# Patient Record
Sex: FEMALE | Race: WHITE | NOT HISPANIC OR LATINO | Employment: UNEMPLOYED | URBAN - METROPOLITAN AREA
[De-identification: names, ages, dates, MRNs, and addresses within clinical notes are randomized per-mention and may not be internally consistent; named-entity substitution may affect disease eponyms.]

---

## 2023-01-22 ENCOUNTER — HOSPITAL ENCOUNTER (OUTPATIENT)
Facility: CLINIC | Age: 48
Setting detail: OBSERVATION
Discharge: HOME | End: 2023-01-24
Attending: EMERGENCY MEDICINE | Admitting: PSYCHIATRY & NEUROLOGY
Payer: MEDICARE

## 2023-01-22 DIAGNOSIS — R45.851 SUICIDAL IDEATION: ICD-10-CM

## 2023-01-22 DIAGNOSIS — F41.1 GAD (GENERALIZED ANXIETY DISORDER): ICD-10-CM

## 2023-01-22 DIAGNOSIS — R44.0 AUDITORY HALLUCINATION: ICD-10-CM

## 2023-01-22 DIAGNOSIS — F42.9 OBSESSIVE-COMPULSIVE DISORDER, UNSPECIFIED TYPE: ICD-10-CM

## 2023-01-22 DIAGNOSIS — F51.01 PRIMARY INSOMNIA: ICD-10-CM

## 2023-01-22 DIAGNOSIS — F43.10 PTSD (POST-TRAUMATIC STRESS DISORDER): ICD-10-CM

## 2023-01-22 LAB
ALBUMIN SERPL BCG-MCNC: 4.4 G/DL (ref 3.5–5.2)
ALBUMIN UR-MCNC: 50 MG/DL
ALP SERPL-CCNC: 100 U/L (ref 35–104)
ALT SERPL W P-5'-P-CCNC: 13 U/L (ref 10–35)
AMPHETAMINES UR QL SCN: ABNORMAL
ANION GAP SERPL CALCULATED.3IONS-SCNC: 9 MMOL/L (ref 7–15)
APPEARANCE UR: CLEAR
AST SERPL W P-5'-P-CCNC: 24 U/L (ref 10–35)
BARBITURATES UR QL SCN: ABNORMAL
BASOPHILS # BLD AUTO: 0 10E3/UL (ref 0–0.2)
BASOPHILS NFR BLD AUTO: 1 %
BENZODIAZ UR QL SCN: ABNORMAL
BILIRUB SERPL-MCNC: 0.5 MG/DL
BILIRUB UR QL STRIP: NEGATIVE
BUN SERPL-MCNC: 11.8 MG/DL (ref 6–20)
BZE UR QL SCN: ABNORMAL
CALCIUM SERPL-MCNC: 9.2 MG/DL (ref 8.6–10)
CANNABINOIDS UR QL SCN: ABNORMAL
CHLORIDE SERPL-SCNC: 103 MMOL/L (ref 98–107)
COLOR UR AUTO: YELLOW
CREAT SERPL-MCNC: 0.58 MG/DL (ref 0.51–0.95)
DACRYOCYTES BLD QL SMEAR: SLIGHT
DEPRECATED HCO3 PLAS-SCNC: 25 MMOL/L (ref 22–29)
ELLIPTOCYTES BLD QL SMEAR: SLIGHT
EOSINOPHIL # BLD AUTO: 0.1 10E3/UL (ref 0–0.7)
EOSINOPHIL NFR BLD AUTO: 2 %
ERYTHROCYTE [DISTWIDTH] IN BLOOD BY AUTOMATED COUNT: 19.3 % (ref 10–15)
ETHANOL SERPL-MCNC: <0.01 G/DL
GFR SERPL CREATININE-BSD FRML MDRD: >90 ML/MIN/1.73M2
GLUCOSE SERPL-MCNC: 93 MG/DL (ref 70–99)
GLUCOSE UR STRIP-MCNC: NEGATIVE MG/DL
HCG SERPL QL: NEGATIVE
HCT VFR BLD AUTO: 28.2 % (ref 35–47)
HGB BLD-MCNC: 7.3 G/DL (ref 11.7–15.7)
HGB UR QL STRIP: NEGATIVE
IMM GRANULOCYTES # BLD: 0 10E3/UL
IMM GRANULOCYTES NFR BLD: 0 %
KETONES UR STRIP-MCNC: NEGATIVE MG/DL
LEUKOCYTE ESTERASE UR QL STRIP: ABNORMAL
LYMPHOCYTES # BLD AUTO: 1.6 10E3/UL (ref 0.8–5.3)
LYMPHOCYTES NFR BLD AUTO: 30 %
MCH RBC QN AUTO: 16 PG (ref 26.5–33)
MCHC RBC AUTO-ENTMCNC: 25.9 G/DL (ref 31.5–36.5)
MCV RBC AUTO: 62 FL (ref 78–100)
MONOCYTES # BLD AUTO: 0.5 10E3/UL (ref 0–1.3)
MONOCYTES NFR BLD AUTO: 9 %
MUCOUS THREADS #/AREA URNS LPF: PRESENT /LPF
NEUTROPHILS # BLD AUTO: 3.2 10E3/UL (ref 1.6–8.3)
NEUTROPHILS NFR BLD AUTO: 58 %
NITRATE UR QL: NEGATIVE
NRBC # BLD AUTO: 0 10E3/UL
NRBC BLD AUTO-RTO: 0 /100
OPIATES UR QL SCN: ABNORMAL
PCP QUAL URINE (ROCHE): ABNORMAL
PH UR STRIP: 5.5 [PH] (ref 5–7)
PLAT MORPH BLD: ABNORMAL
PLATELET # BLD AUTO: 398 10E3/UL (ref 150–450)
POTASSIUM SERPL-SCNC: 3.5 MMOL/L (ref 3.4–5.3)
PROT SERPL-MCNC: 8.1 G/DL (ref 6.4–8.3)
RBC # BLD AUTO: 4.55 10E6/UL (ref 3.8–5.2)
RBC MORPH BLD: ABNORMAL
RBC URINE: 2 /HPF
SARS-COV-2 RNA RESP QL NAA+PROBE: NEGATIVE
SODIUM SERPL-SCNC: 137 MMOL/L (ref 136–145)
SP GR UR STRIP: 1.03 (ref 1–1.03)
SQUAMOUS EPITHELIAL: 4 /HPF
UROBILINOGEN UR STRIP-MCNC: NORMAL MG/DL
WBC # BLD AUTO: 5.4 10E3/UL (ref 4–11)
WBC URINE: 7 /HPF

## 2023-01-22 PROCEDURE — 80307 DRUG TEST PRSMV CHEM ANLYZR: CPT | Performed by: EMERGENCY MEDICINE

## 2023-01-22 PROCEDURE — U0005 INFEC AGEN DETEC AMPLI PROBE: HCPCS | Performed by: EMERGENCY MEDICINE

## 2023-01-22 PROCEDURE — 85025 COMPLETE CBC W/AUTO DIFF WBC: CPT | Performed by: EMERGENCY MEDICINE

## 2023-01-22 PROCEDURE — 84703 CHORIONIC GONADOTROPIN ASSAY: CPT | Performed by: EMERGENCY MEDICINE

## 2023-01-22 PROCEDURE — 81001 URINALYSIS AUTO W/SCOPE: CPT | Performed by: EMERGENCY MEDICINE

## 2023-01-22 PROCEDURE — 99285 EMERGENCY DEPT VISIT HI MDM: CPT | Mod: 25

## 2023-01-22 PROCEDURE — 90791 PSYCH DIAGNOSTIC EVALUATION: CPT

## 2023-01-22 PROCEDURE — 36415 COLL VENOUS BLD VENIPUNCTURE: CPT | Performed by: EMERGENCY MEDICINE

## 2023-01-22 PROCEDURE — C9803 HOPD COVID-19 SPEC COLLECT: HCPCS

## 2023-01-22 PROCEDURE — 250N000013 HC RX MED GY IP 250 OP 250 PS 637: Performed by: EMERGENCY MEDICINE

## 2023-01-22 PROCEDURE — G0378 HOSPITAL OBSERVATION PER HR: HCPCS

## 2023-01-22 PROCEDURE — 80053 COMPREHEN METABOLIC PANEL: CPT | Performed by: EMERGENCY MEDICINE

## 2023-01-22 PROCEDURE — 82077 ASSAY SPEC XCP UR&BREATH IA: CPT | Performed by: EMERGENCY MEDICINE

## 2023-01-22 RX ORDER — FERROUS SULFATE 325(65) MG
325 TABLET ORAL ONCE
Status: COMPLETED | OUTPATIENT
Start: 2023-01-22 | End: 2023-01-22

## 2023-01-22 RX ORDER — FERROUS SULFATE 325(65) MG
325 TABLET ORAL DAILY
Status: DISCONTINUED | OUTPATIENT
Start: 2023-01-23 | End: 2023-01-24 | Stop reason: HOSPADM

## 2023-01-22 RX ORDER — OLANZAPINE 10 MG/1
10 TABLET, ORALLY DISINTEGRATING ORAL ONCE
Status: COMPLETED | OUTPATIENT
Start: 2023-01-22 | End: 2023-01-22

## 2023-01-22 RX ORDER — ZOLPIDEM TARTRATE 5 MG/1
5 TABLET ORAL
Status: DISCONTINUED | OUTPATIENT
Start: 2023-01-22 | End: 2023-01-23

## 2023-01-22 RX ORDER — OLANZAPINE 10 MG/1
10 TABLET, ORALLY DISINTEGRATING ORAL DAILY PRN
Status: DISCONTINUED | OUTPATIENT
Start: 2023-01-22 | End: 2023-01-23

## 2023-01-22 RX ADMIN — ZOLPIDEM TARTRATE 5 MG: 5 TABLET ORAL at 22:40

## 2023-01-22 RX ADMIN — OLANZAPINE 10 MG: 10 TABLET, ORALLY DISINTEGRATING ORAL at 19:23

## 2023-01-22 ASSESSMENT — COLUMBIA-SUICIDE SEVERITY RATING SCALE - C-SSRS
3. HAVE YOU BEEN THINKING ABOUT HOW YOU MIGHT KILL YOURSELF?: YES
TOTAL  NUMBER OF ABORTED OR SELF INTERRUPTED ATTEMPTS LIFETIME: NO
2. HAVE YOU ACTUALLY HAD ANY THOUGHTS OF KILLING YOURSELF?: YES
TOTAL  NUMBER OF PREPARATORY ACTS LIFETIME: 1
LETHALITY/MEDICAL DAMAGE CODE MOST RECENT POTENTIAL ATTEMPT: BEHAVIOR LIKELY TO RESULT IN INJURY BUT NOT LIKELY TO CAUSE DEATH
2. HAVE YOU ACTUALLY HAD ANY THOUGHTS OF KILLING YOURSELF?: YES
LETHALITY/MEDICAL DAMAGE CODE MOST RECENT ACTUAL ATTEMPT: NO PHYSICAL DAMAGE OR VERY MINOR PHYSICAL DAMAGE
1. HAVE YOU WISHED YOU WERE DEAD OR WISHED YOU COULD GO TO SLEEP AND NOT WAKE UP?: YES
5. HAVE YOU STARTED TO WORK OUT OR WORKED OUT THE DETAILS OF HOW TO KILL YOURSELF? DO YOU INTEND TO CARRY OUT THIS PLAN?: YES
FIRST ATTEMPT DATE: 58074
TOTAL  NUMBER OF ACTUAL ATTEMPTS LIFETIME: 1
1. IN THE PAST MONTH, HAVE YOU WISHED YOU WERE DEAD OR WISHED YOU COULD GO TO SLEEP AND NOT WAKE UP?: YES
LETHALITY/MEDICAL DAMAGE CODE FIRST ACTUAL ATTEMPT: NO PHYSICAL DAMAGE OR VERY MINOR PHYSICAL DAMAGE
LETHALITY/MEDICAL DAMAGE CODE FIRST POTENTIAL ATTEMPT: BEHAVIOR LIKELY TO RESULT IN INJURY BUT NOT LIKELY TO CAUSE DEATH
MOST RECENT DATE: 58074
4. HAVE YOU HAD THESE THOUGHTS AND HAD SOME INTENTION OF ACTING ON THEM?: YES
ATTEMPT PAST THREE MONTHS: NO
6. HAVE YOU EVER DONE ANYTHING, STARTED TO DO ANYTHING, OR PREPARED TO DO ANYTHING TO END YOUR LIFE?: NO
5. HAVE YOU STARTED TO WORK OUT OR WORKED OUT THE DETAILS OF HOW TO KILL YOURSELF? DO YOU INTEND TO CARRY OUT THIS PLAN?: NO
TOTAL  NUMBER OF INTERRUPTED ATTEMPTS LIFETIME: NO
REASONS FOR IDEATION PAST MONTH: COMPLETELY TO END OR STOP THE PAIN (YOU COULDN'T GO ON LIVING WITH THE PAIN OR HOW YOU WERE FEELING)
REASONS FOR IDEATION LIFETIME: COMPLETELY TO END OR STOP THE PAIN (YOU COULDN'T GO ON LIVING WITH THE PAIN OR HOW YOU WERE FEELING)
ATTEMPT LIFETIME: YES
4. HAVE YOU HAD THESE THOUGHTS AND HAD SOME INTENTION OF ACTING ON THEM?: NO
6. HAVE YOU EVER DONE ANYTHING, STARTED TO DO ANYTHING, OR PREPARED TO DO ANYTHING TO END YOUR LIFE?: YES

## 2023-01-22 ASSESSMENT — ENCOUNTER SYMPTOMS
VOMITING: 0
FEVER: 0
ARTHRALGIAS: 1
NERVOUS/ANXIOUS: 1
WEAKNESS: 1
BACK PAIN: 1
PALPITATIONS: 1
DIARRHEA: 1
COUGH: 1
HALLUCINATIONS: 1

## 2023-01-22 ASSESSMENT — ACTIVITIES OF DAILY LIVING (ADL)
ADLS_ACUITY_SCORE: 35
ADLS_ACUITY_SCORE: 33
ADLS_ACUITY_SCORE: 35

## 2023-01-22 NOTE — ED TRIAGE NOTES
Patient recently moved here from [place], ran out of some of her medications and needs refills.     Triage Assessment     Row Name 01/22/23 0556       Triage Assessment (Adult)    Airway WDL WDL       Respiratory WDL    Respiratory WDL WDL       Skin Circulation/Temperature WDL    Skin Circulation/Temperature WDL WDL       Cardiac WDL    Cardiac WDL WDL       Peripheral/Neurovascular WDL    Peripheral Neurovascular WDL WDL       Cognitive/Neuro/Behavioral WDL    Cognitive/Neuro/Behavioral WDL WDL

## 2023-01-23 VITALS
DIASTOLIC BLOOD PRESSURE: 67 MMHG | WEIGHT: 154.1 LBS | SYSTOLIC BLOOD PRESSURE: 102 MMHG | HEART RATE: 75 BPM | BODY MASS INDEX: 26.31 KG/M2 | RESPIRATION RATE: 16 BRPM | OXYGEN SATURATION: 97 % | HEIGHT: 64 IN | TEMPERATURE: 97.5 F

## 2023-01-23 PROCEDURE — 99223 1ST HOSP IP/OBS HIGH 75: CPT | Mod: AI | Performed by: PSYCHIATRY & NEUROLOGY

## 2023-01-23 PROCEDURE — 250N000013 HC RX MED GY IP 250 OP 250 PS 637: Performed by: PSYCHIATRY & NEUROLOGY

## 2023-01-23 PROCEDURE — G0378 HOSPITAL OBSERVATION PER HR: HCPCS

## 2023-01-23 RX ORDER — TRAZODONE HYDROCHLORIDE 50 MG/1
50 TABLET, FILM COATED ORAL
Status: DISCONTINUED | OUTPATIENT
Start: 2023-01-23 | End: 2023-01-24 | Stop reason: HOSPADM

## 2023-01-23 RX ORDER — OLANZAPINE 10 MG/1
10 TABLET ORAL AT BEDTIME
Status: DISCONTINUED | OUTPATIENT
Start: 2023-01-23 | End: 2023-01-24 | Stop reason: HOSPADM

## 2023-01-23 RX ORDER — HYDROXYZINE HYDROCHLORIDE 50 MG/1
50 TABLET, FILM COATED ORAL EVERY 6 HOURS PRN
Status: DISCONTINUED | OUTPATIENT
Start: 2023-01-23 | End: 2023-01-24 | Stop reason: HOSPADM

## 2023-01-23 RX ORDER — ZOLPIDEM TARTRATE 5 MG/1
5 TABLET ORAL
Status: DISCONTINUED | OUTPATIENT
Start: 2023-01-23 | End: 2023-01-23

## 2023-01-23 RX ORDER — OLANZAPINE 5 MG/1
5-10 TABLET, ORALLY DISINTEGRATING ORAL EVERY 6 HOURS PRN
Status: DISCONTINUED | OUTPATIENT
Start: 2023-01-23 | End: 2023-01-24 | Stop reason: HOSPADM

## 2023-01-23 RX ORDER — ZOLPIDEM TARTRATE 5 MG/1
5 TABLET ORAL ONCE
Status: DISCONTINUED | OUTPATIENT
Start: 2023-01-23 | End: 2023-01-23

## 2023-01-23 RX ORDER — FLUVOXAMINE MALEATE 25 MG
50 TABLET ORAL AT BEDTIME
Status: DISCONTINUED | OUTPATIENT
Start: 2023-01-23 | End: 2023-01-24 | Stop reason: HOSPADM

## 2023-01-23 RX ADMIN — OLANZAPINE 10 MG: 5 TABLET, ORALLY DISINTEGRATING ORAL at 23:03

## 2023-01-23 RX ADMIN — OLANZAPINE 10 MG: 5 TABLET, ORALLY DISINTEGRATING ORAL at 11:49

## 2023-01-23 RX ADMIN — FLUVOXAMINE MALEATE 50 MG: 25 TABLET ORAL at 21:31

## 2023-01-23 RX ADMIN — OLANZAPINE 10 MG: 5 TABLET, ORALLY DISINTEGRATING ORAL at 16:21

## 2023-01-23 RX ADMIN — OLANZAPINE 10 MG: 10 TABLET, FILM COATED ORAL at 21:31

## 2023-01-23 ASSESSMENT — ACTIVITIES OF DAILY LIVING (ADL)
ADLS_ACUITY_SCORE: 35

## 2023-01-23 NOTE — ED NOTES
[facility] Crisis Reassessment      Marga Chi was reassessed for the following reasons: observation status and explore disposition for the day. Pt was first seen on 1/22/2023 at 2212 by by SHELTON Avery, TRACY; see the initial assessment note for details.      Patient Presentation    Initial ED presentation details: Medication refills, worsening mental health symptoms (anxiety, depression, SI, AH).      Current patient presentation: Patient was watching a show on the iPad when writer knocked on Sensory Room D (SRD) door to introduce self and invite to meet for reassessment.  Patient paused the iPad, welcomed writer and agreed to meet in SRD.  She presented with pressured speech, variable eye contact, complained of anxiety, OCD thoughts, and worry about decreased functioning.  Patient notes she does not have much money currently, not able to work in her current condition, and just signed a 12-month lease on an apartment in [place].      Patient reports that she is fearful, cannot calm her mind, OCD tendencies are increasing, losing interest in hobbies, and worrying about life so much that she hides or isolates.  She talks about previous interests and hobbies of traveling, painting, writing, dancing.      Patient shares that she suddenly stopped medications and does not want to be on medications long-term.  Patient listed her former medications and identified which ones did not work or she does not want to restart due to side effects.  She briefly references a trauma event that recently happened which could be a contributing factor to current presentation so writer offered to process that trauma with patient later in the day.      Risk of Harm    Patient denies current SI.  She will be staying another night on observation to restart medications.     Does the patient have thoughts of harming others? No    Mental Status Exam   Affect: Appropriate   Appearance: Appropriate    Attention Span/Concentration:  Attentive?    Eye Contact: Engaged   Fund of Knowledge: Appropriate    Language /Speech Content: Fluent   Language /Speech Volume: Normal    Language /Speech Rate/Productions: Hyperverbal and Pressured    Recent Memory: Intact   Remote Memory: Intact   Mood: Anxious and Sad    Orientation to Person: Yes    Orientation to Place: Yes   Orientation to Time of Day: Yes    Orientation to Date: Yes    Situation (Do they understand why they are here?): Yes    Psychomotor Behavior: Normal    Thought Content: Hallucinations and Other: obsessive/compulsive   Thought Form: Intact and Obsessive/Perseverative       Additional Collateral Information   No Emerg. Contact on file      Therapeutic Intervention  The following therapeutic methodologies were employed when working with the patient: Establishing rapport, Active listening, Assess dimensions of crisis, Apply solution-focused therapy to address current crisis, Motivational Interviewing and Brief Supportive Therapy. Patient response to intervention: engaged and cooperative.    Diagnosis:   F20.3   Undifferentiated schizophrenia - by history                              F41.9   Unspecified anxiety disorder - by history                                             F32.9   Unspecified depressive disorder - by history    Clinical Substantiation of Recommendations  Patient reports being fearful yet this morning despite getting some sleep last night.  She spoke of here stressors: not having a lot of money, losing interest in hobbies, excessive worry, hiding from the world, isolating.  Patient also briefly referenced a recent trauma event that could be contributing to her current situation.  She is interested in getting set up with outpatient therapy and medication management however only has medicare insurance at the moment.  Writer will provide community resources to assist with applying for Medicaid and connecting with low-cost/sliding-fee providers. Patient will be observation  status tonight as medications are restarted and added.     Plan:    Disposition  Recommended disposition: Individual Therapy and Medication Management      Reviewed case and recommendations with attending provider. Attending Name: Gricelda      Attending concurs with disposition: Yes      Patient concurs with disposition: Yes      Final disposition: Other: observation again tonight to restart/add medications.         Assessment Details  Total duration spent on the patient case in minutes: .50 hrs     CPT code(s) utilized: 88101 - Psychotherapy (with patient) - 30 (16-37*) min       SHELTON Gatica, MSW  Callback: [phone]

## 2023-01-23 NOTE — PLAN OF CARE
"Marga Chi  January 22, 2023  Plan of Care Hand-off Note     Patient Care Path: Observation    Plan for Care:   Patient reported previous mental health diagnoses of schizophrenia, depression, anxiety, PTSD, and OCD. Patient reported that she used to have a psychiatrist prescribe her psychotropics. She moved to [place] due to concerns of being stalked and moved to [place] recently in December 2022. She has struggled to find providers in [place]. She attempted to get her medications refilled earlier this month but was denied. She stated that she has been off her medications since December. She stated that she attempted to manage her sx on her own, but has struggled.     Today, she called 911 for an ambulance to bring her to the ED because she was feeling overwhelmed and scared. She reported experiencing the following symptoms for the last 3-4 weeks: command auditory hallucinations that tell her to hurt herself, SI, anxiety/fear, negative/racing thoughts, depressed mood, decreased appetite, and anhedonia. She reported that she has not been sleeping due to the voices (average 1-3 hours/night). Patient endorsed current CAH and SI. She reported that she \"kind of\" has a plan to overdose, however she does not have access to medications at home    Critical Safety Issues: Patient is endorsing command auditory hallucinations telling her to harm herself. Pt endorsed SI with \"kind of\" a plan to overdose. However, she does not have access to medications and does not have intent. She stated that her main deterrents include hope that she will get better and her dog, Sagrario.     She stated that her dog will be okay until tomorrow. If she needs to stay at the hospital longer, she will talk to her  about making arrangements. Risk would be mitigated [facility]. She is help seeking and would like to see a psychiatrist in the morning to discuss medications. If discharge is recommended, she " would like to schedule appointments for psychiatry, therapy, and PCP.     Overview:  This patient is a child/adolescent: No    This patient has additional special visitor precautions: No    Legal Status: Voluntary    Contacts:   No emergency contact listed    Psychiatry Consult:  Pending Psych consult at [facility]    Updated RN and Attending Provider regarding plan of care.    SHELTON Avery

## 2023-01-23 NOTE — ED NOTES
"Pt. has remained in the sensory room most of the day. Reportedly does not tolerate the stimuli of being around others at this time. Periods of anxiety. Requested and received 2nd dose of zyprexa today -given at 1620. Reports only one fleeing moment of having a \"thought of not wanting to live\" today. No Suicidal ideation. Reports auditory hallucinations are becoming less intense.   "

## 2023-01-23 NOTE — ED NOTES
Pt. Slightly anxious and fearful upon awakening. VSS. Assisted to order breakfast.  Pt. reports to writer that her medications in the past have been zyprexa, klonopin, prozac and ambien. Does not want to be restarted on Prozac (due to reported weight gain)  Interested in getting back on zyprexa, klonopin and ambien. Would like something to treat OCD. Will be evaluated by psychiatrist this AM.
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
## 2023-01-23 NOTE — ED NOTES
Requested and received prn zyprexa for anxiety. Resting and watching IPAD in sensory room. Feels comfortable with plan to stay on OBS until tomorrow as she restarts medication and stabilizes. Denies current SI.

## 2023-01-23 NOTE — ED PROVIDER NOTES
History     Chief Complaint:  Medication Refill       HPI   Marga Chi is a 47 year old female who presents with worsening mental health for the past 10 days. She has a history of schizophrenia, anxiety, depression, PTSD, and OCD. She has moved from [place] to [place] because she was being stalked and she moved to [place] in december due to financial issues. She tried to fill her medications earlier this month and was declined. She has been off of her meds for at least 10 days. She has been struggling with worsening, anxiety, auditory hallucinations, and suicidal ideation. Additionally, she developed physical symptoms including palpitations, weakness, back pain, hip pain, diarrhea, and a cough in the last 2 weeks. She has not been eating much the past 3 weeks because she doesn't was to do anything. She was unable to leave her house due to her anxiety. She has been confused which is very unlike her. She was hospitalized in [place] due to mental health issues and was put on multiple mental health meds. She stopped taking Prozac on her own because she was gaining weight. She takes edibles occasional but denies other drug use or alcohol use. She has history of what was thought to be iron deficiency anemia.  She does not have heavy menses.  She says she really does not do much iron in her diet as she takes a vegetarian diet.  She has had 2 blood transfusions in the past. She denies fever or vomiting.     Independent Historian: the patient     Review of External Notes: no     ROS:  Review of Systems   Constitutional: Negative for fever.   Respiratory: Positive for cough.    Cardiovascular: Positive for palpitations.   Gastrointestinal: Positive for diarrhea. Negative for vomiting.   Musculoskeletal: Positive for arthralgias and back pain.   Neurological: Positive for weakness.   Psychiatric/Behavioral: Positive for hallucinations and suicidal ideas. The patient is nervous/anxious.    All other systems reviewed and  "are negative.      Allergies:  Sulfa Drugs     Medications:    Various mental health meds    Past Medical History:    Schizophrenia  Anxiety  Depression  PTSD  OCD    Social History:  The patient presents alone    Physical Exam     Patient Vitals for the past 24 hrs:   BP Temp Pulse Resp SpO2 Height Weight   01/22/23 2100 98/55 -- 77 16 97 % -- --   01/22/23 1650 115/71 97.1  F (36.2  C) 93 18 97 % 1.626 m (5' 4\") 56.7 kg (125 lb)        Physical Exam  Constitutional:       General: She is not in acute distress.     Appearance: She is not diaphoretic.   HENT:      Head: Atraumatic.      Mouth/Throat:      Pharynx: No oropharyngeal exudate.   Eyes:      General: No scleral icterus.     Pupils: Pupils are equal, round, and reactive to light.   Cardiovascular:      Rate and Rhythm: Normal rate and regular rhythm.      Heart sounds: Normal heart sounds.   Pulmonary:      Effort: No respiratory distress.      Breath sounds: Normal breath sounds.   Abdominal:      Palpations: Abdomen is soft.      Tenderness: There is no abdominal tenderness.   Musculoskeletal:         General: No tenderness.      Cervical back: Neck supple. No rigidity.      Right lower leg: No edema.      Left lower leg: No edema.   Skin:     General: Skin is warm.      Capillary Refill: Capillary refill takes less than 2 seconds.      Findings: No rash.   Neurological:      General: No focal deficit present.      Mental Status: She is oriented to person, place, and time.   Psychiatric:      Comments: Appears anxious at times.  Cooperative and pleasant.           Emergency Department Course     Laboratory:  Labs Ordered and Resulted from Time of ED Arrival to Time of ED Departure   ROUTINE UA WITH MICROSCOPIC REFLEX TO CULTURE - Abnormal       Result Value    Color Urine Yellow      Appearance Urine Clear      Glucose Urine Negative      Bilirubin Urine Negative      Ketones Urine Negative      Specific Gravity Urine 1.030      Blood Urine Negative      " pH Urine 5.5      Protein Albumin Urine 50 (*)     Urobilinogen Urine Normal      Nitrite Urine Negative      Leukocyte Esterase Urine Trace (*)     Mucus Urine Present (*)     RBC Urine 2      WBC Urine 7 (*)     Squamous Epithelials Urine 4 (*)    CBC WITH PLATELETS AND DIFFERENTIAL - Abnormal    WBC Count 5.4      RBC Count 4.55      Hemoglobin 7.3 (*)     Hematocrit 28.2 (*)     MCV 62 (*)     MCH 16.0 (*)     MCHC 25.9 (*)     RDW 19.3 (*)     Platelet Count 398      % Neutrophils 58      % Lymphocytes 30      % Monocytes 9      % Eosinophils 2      % Basophils 1      % Immature Granulocytes 0      NRBCs per 100 WBC 0      Absolute Neutrophils 3.2      Absolute Lymphocytes 1.6      Absolute Monocytes 0.5      Absolute Eosinophils 0.1      Absolute Basophils 0.0      Absolute Immature Granulocytes 0.0      Absolute NRBCs 0.0     DRUG ABUSE SCREEN 77 URINE (FL, RH, SH) - Abnormal    Amphetamines Urine Screen Negative      Barbituates Urine Screen Negative      Benzodiazepine Urine Screen Negative      Cannabinoids Urine Screen Positive (*)     Opiates Urine Screen Negative      PCP Urine Screen Negative      Cocaine Urine Screen Negative     RBC AND PLATELET MORPHOLOGY - Abnormal    Platelet Assessment        Value: Automated Count Confirmed. Platelet morphology is normal.    Elliptocytes Slight (*)     Teardrop Cells Slight (*)     RBC Morphology Confirmed RBC Indices     COVID-19 VIRUS (CORONAVIRUS) BY PCR - Normal    SARS CoV2 PCR Negative     COMPREHENSIVE METABOLIC PANEL - Normal    Sodium 137      Potassium 3.5      Chloride 103      Carbon Dioxide (CO2) 25      Anion Gap 9      Urea Nitrogen 11.8      Creatinine 0.58      Calcium 9.2      Glucose 93      Alkaline Phosphatase 100      AST 24      ALT 13      Protein Total 8.1      Albumin 4.4      Bilirubin Total 0.5      GFR Estimate >90     ETHYL ALCOHOL LEVEL - Normal    Alcohol ethyl <0.01     HCG QUALITATIVE PREGNANCY - Normal    hCG Serum Qualitative  Negative          Emergency Department Course & Assessments:    PSS-3    Date and Time Over the past 2 weeks have you felt down, depressed, or hopeless? Over the past 2 weeks have you had thoughts of killing yourself? Have you ever attempted to kill yourself? When did this last happen? User   01/22/23 1700 yes no yes more than 6 months ago ZUNILDA      C-SSRS ([place])    Date and Time Q1 Wished to be Dead (Past Month) Q2 Suicidal Thoughts (Past Month) Q3 Suicidal Thought Method Q4 Suicidal Intent without Specific Plan Q5 Suicide Intent with Specific Plan Q6 Suicide Behavior (Lifetime) Within the Past 3 Months? RETIRED: Level of Risk per Screen Screening Not Complete User   01/22/23 1914 yes yes yes no yes yes -- -- -- KJL                Suicide assessment completed by mental health (D.E.C., LCSW, etc.)    Interventions:  Medications   zolpidem (AMBIEN) tablet 5 mg (has no administration in time range)   OLANZapine zydis (zyPREXA) ODT tab 10 mg (has no administration in time range)   OLANZapine zydis (zyPREXA) ODT tab 10 mg (10 mg Oral Given 1/22/23 1923)   ferrous sulfate (FEROSUL) tablet 325 mg (325 mg Oral Not Given 1/22/23 2056)        Social Determinants of Health affecting care:  Moved to [place] from [place]  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  She presents with increasing paranoid thoughts and anxiety after she ran out of her medications about 3 weeks ago.  She says she had been living in [place] where she was treated and then spent some time in [place].  She says she felt that she was being stalked there and so came to [place] thinking that the cost of living would be less.  She does not have any family or support here.    The patient is cooperative in the room.  She does endorse hallucinations and at times suicidal thoughts although she has not made any specific attempt to  hurt her self or come up with a plan.    We are awaiting DEC evaluation to further assist the patient.  She was given a dose of Zyprexa in the meantime.    Patient says that she is chronically anemic.  She relates this to iron deficiency.  She does not have heavy menses or other source of bleeding.  CBC demonstrates microcytic anemia.  However, she is not tachycardic or hypotensive and this is likely chronic.  I ordered an iron supplement which the patient declined as she feels that it gives her some bloating and gassiness and she does not want to deal with this while she is having evaluation of her current psychiatric condition.  The plan for tonight is to send her to the [facility] unit.    When she is eventually ready for discharge she will need a prescription for ferrous sulfate and primary care referral.      Diagnosis:    ICD-10-CM    1. Suicidal ideation  R45.851       2. Schizophrenia, unspecified type (H)  F20.9       3. Microcytic anemia  D50.9            Scribe Disclosure:  I, Sherry Stratton, am serving as a scribe at 6:44 PM on 1/22/2023 to document services personally performed by Mateus Ny MD based on my observations and the provider's statements to me.    1/22/2023   Mateus Ny MD McRoberts, Sean Edward, MD  01/22/23 2136       Mateus Ny MD  01/22/23 2155

## 2023-01-23 NOTE — ED NOTES
"47 year old female with history of schizophrenia, anxiety, depression, PTSD, and OCD received from ED. Reports she has been off her medications for about two to three weeks. Patient currently endorses auditory hallucinations. Describes \"hearing voices telling me to end it.\"  She also endorsed having chronic suicidal thoughts with no specific plan. States her last suicide attempt was in her 20's when she tried to overdose. However, she admitted to making plans to overdose last fall but aborted. Patient reports that one of her biggest stressors is being estranged from her family. She states she ended up moving from [place] to [place] because of being stalked and moved to [place] in December for financial reasons. Patient reports she has been self medicating with marijuana. She last took edibles yesterday. She hopes to stabilize her mental health and become more functional. Patient also hopes to get rid of the negative thoughts.    Nursing and risk assessments completed. Assessments reviewed with LM [facility] and physician. Admission information reviewed with patient. Patient given a tour of [facility] and instructions on using the facility. Questions regarding [facility] addressed. Pt safety search completed.   "
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
## 2023-01-24 PROCEDURE — 99239 HOSP IP/OBS DSCHRG MGMT >30: CPT | Performed by: PSYCHIATRY & NEUROLOGY

## 2023-01-24 PROCEDURE — G0378 HOSPITAL OBSERVATION PER HR: HCPCS

## 2023-01-24 PROCEDURE — 250N000013 HC RX MED GY IP 250 OP 250 PS 637: Performed by: PSYCHIATRY & NEUROLOGY

## 2023-01-24 RX ORDER — RAMELTEON 8 MG/1
8 TABLET ORAL AT BEDTIME
Qty: 15 TABLET | Refills: 1 | Status: SHIPPED | OUTPATIENT
Start: 2023-01-24 | End: 2023-01-24

## 2023-01-24 RX ORDER — OLANZAPINE 10 MG/1
10 TABLET ORAL AT BEDTIME
Qty: 15 TABLET | Refills: 1 | Status: SHIPPED | OUTPATIENT
Start: 2023-01-24

## 2023-01-24 RX ORDER — RAMELTEON 8 MG/1
8 TABLET ORAL AT BEDTIME
Qty: 15 TABLET | Refills: 1 | Status: SHIPPED | OUTPATIENT
Start: 2023-01-24

## 2023-01-24 RX ADMIN — OLANZAPINE 10 MG: 5 TABLET, ORALLY DISINTEGRATING ORAL at 09:56

## 2023-01-24 ASSESSMENT — COLUMBIA-SUICIDE SEVERITY RATING SCALE - C-SSRS
1. SINCE LAST CONTACT, HAVE YOU WISHED YOU WERE DEAD OR WISHED YOU COULD GO TO SLEEP AND NOT WAKE UP?: YES
TOTAL  NUMBER OF INTERRUPTED ATTEMPTS SINCE LAST CONTACT: NO
6. HAVE YOU EVER DONE ANYTHING, STARTED TO DO ANYTHING, OR PREPARED TO DO ANYTHING TO END YOUR LIFE?: NO
SUICIDE, SINCE LAST CONTACT: NO
2. HAVE YOU ACTUALLY HAD ANY THOUGHTS OF KILLING YOURSELF?: NO
TOTAL  NUMBER OF ABORTED OR SELF INTERRUPTED ATTEMPTS SINCE LAST CONTACT: NO
ATTEMPT SINCE LAST CONTACT: NO
REASONS FOR IDEATION SINCE LAST CONTACT: MOSTLY TO GET ATTENTION, REVENGE, OR A REACTION FROM OTHERS

## 2023-01-24 ASSESSMENT — ACTIVITIES OF DAILY LIVING (ADL)
ADLS_ACUITY_SCORE: 35

## 2023-01-24 NOTE — PROGRESS NOTES
[facility] Note:     Therapist was informed that pt was asking to speak with a therapist. Therapist entered the sensory room, introduced self and asked how I could help her tonight. Pt stated 'You people could give me my med. Now I have suicidal thoughts that won't stop. I haven't been able to sleep in weeks and you people took away the one thing that helped me sleep. Do you see what I have to sleep on, my dog has a nicer bed than that. You people need to get beds in here. Do you hear me when I say I haven't slept in weeks, that's a long time. I am glad that I have saved up my seroquel and when I get home, I'm just going to take it all.' Therapist offered calming skills available on the unit such as: coloring, doing a puzzle, watching a movie, doing a crossword or sudoku, reading a book or using the stationary bike. Pt adamantly stated that none of those things would help her suicidal thoughts go away. Therapist reiterated that utilizing the skills may not make the thoughts go away, however, they could decrease the thoughts so they aren't so overwhelming. Pt stated, 'so you're going to treat me like a 6 year old and say I need to color.' Therapist reiterated that many options were offered and asked if there was anything that she would like to try. Pt continued to state that she wanted the medication to help her sleep, does not want to color and stresses that her dog has a nicer bed. She states that her dad is an  and can guerre[place] us. When therapist offered skills again, pt stated that therapist couldn't help her and sat down in the recliner.

## 2023-01-24 NOTE — ED NOTES
Pt. reports that she did not have restful sleep last night- attributes to not having the Ambien. Remains convinced that ambien is the only medication that has been helpful. VSS. Ate breakfast. Requesting to go home due to needing to care for her dog. Will be re-evaluated by [facility] and psychiatry.

## 2023-01-24 NOTE — ED NOTES
Pt. became agitated and angry that psychiatric provider discontinued Ambien. Reports that she can not achieve sleep without Ambien. Tearful. Offered options but not open to any options. Plans to discuss with psychiatric provider in AM.

## 2023-01-24 NOTE — ED NOTES
[facility] Note:       Patient was invited to participate in group programming but declined to attend.       KAYLEY Umanzor on 1/23/2023 at 7:23 PM

## 2023-01-24 NOTE — ED PROVIDER NOTES
[facility] Unit - Psychiatric Observation Discharge Summary  [facility]  Discharge Date: 1/24/2023    Marga Chi MRN: [id number]   Age: 47 year old YOB: 1975     Brief HPI & Initial ED Course     Chief Complaint   Patient presents with     Medication Refill     HPI  Marga Chi is a 47 year old female with history notable for obsessive-compulsive disorder, PTSD, and auditory hallucinations who is currently under observation status on the EmPATH unit.  On my initial meeting with the patient yesterday, it was noted that the patient had been off of her psychotropic medications for a significant period of time and currently did not have access to mental health providers in the community.  She presented to the emergency department reporting acute decompensation and hoping to restart medications that were previously effective for her.  After she had taken a combination of Prozac and Zyprexa for some time, she began to gain weight and assumed that it was secondary to Prozac, leading to discontinuation of that medication.  [place] access to her medications and prescribers which led to acute decompensation in her arrival in the emergency department.    Yesterday evening, staff and documentation indicates that the patient had an eventful evening.  Much of this was related to her desire to take Ambien for insomnia.  She had implied that Ambien effectively reduces auditory hallucinations and suicidal thoughts.  She was not interested in utilizing alternative medications for insomnia other than Ambien.  Staff worked with her on various alternative strategies to address her emotional escalation and insomnia.    On reassessment today, the patient was quick to verbalize her anger and frustration regarding her medication plan and inability to sleep last night.  She began by informing me that she has a relative who is an  who will not be happy to hear  how she has been treated in the hospital, mainly related to not receiving Ambien.  She stated several times that her treatment plan has been a failure as she did not gain any meaningful improvements yesterday evening.  This seemed to be directly related to her not receiving Ambien.  Again, we spent time reviewing prescribing etiquette around controlled substances, aimed at maintaining a safe prescribing pattern.  We again reviewed the importance of establishing with an outpatient provider to discuss whether controlled substances should be part of her ongoing medication plan.  At this point, she does not have prescribers in the community and therefore does not have a provider to continue these medications for her.  She implied that without Ambien, suicidal thoughts may worsen as many auditory hallucinations.  With some discussion, she was able to relate this potential progression of symptoms as being related to insomnia.  The patient inquired regarding prescriptions of Ativan, Klonopin, or Xanax as alternatives to Ambien.  We discussed that those medications are also controlled substances and therefore carry similar concerns as Ambien.  She angrily states that she has tried various alternative medications, mentioning trazodone, over-the-counter medications, Seroquel, and melatonin without benefit.  She has not tried Rozerem or gabapentin.    The patient also notes feeling more anxious and restless yesterday evening which she attributes to her first dose of Luvox.  As a result, she does not wish to continue taking the medication.  She does not recall a similar effect when taking Prozac.  She had questions regarding potential weight gain risk of Prozac while noting that when she gained weight, she was taking the medication in conjunction with Zyprexa.  She does not wish to discontinue Zyprexa despite discussing the risk of weight gain associated with Zyprexa.  We discussed ways that weight gain can be mitigated with  "pharmacological and nonpharmacological interventions if it needed to be addressed again in the future.    She inquired regarding her aftercare plan.  She wanted to ensure that an appointment would occur as soon as possible.  We discussed utilizing the transition clinic if there was a delay in seeing a therapist or prescriber in the community following her intake appointment at the clinic.    She emphasized the importance of returning home today as her daughter has been left alone in their food and water sources will need to be replenished.  Therefore, she is not seeking additional time on the unit or transfer to inpatient psychiatry.    She inquired how soon she may be discharged from the unit.  We discussed the wait time needed for the pharmacy to fill medications.  Alternatively, I offered to send her prescriptions to an outside pharmacy however she states that she does not drive and acquiring transportation would be too expensive.    As the interview progressed, her anger subsided.  She was able to regain a calm and euthymic composure by the end of our meeting.  She expressed appreciation for her treatment plan and the discussion we had during our time together today.  At the end of our interview, she was not experiencing active suicidal thoughts.  Psychotic symptoms were at low intensity.  Given the treatment plan outlined below, she felt safe and comfortable transitioning back to the community today.        Physical Examination   BP: 102/67  Pulse: 75  Temp: 97.5  F (36.4  C)  Resp: 16  Height: 162.6 cm (5' 4\")  Weight: 69.9 kg (154 lb 1.6 oz)  SpO2: 97 %    Physical Exam  General: Appears stated age.   Neuro: Alert and fully oriented. Extremities appear to demonstrate normal strength on visual inspection.   Integumentary/Skin: no rash visualized, normal color    Psychiatric Examination   Appearance: awake, alert  Attitude:  cooperative  Eye Contact:  fair  Mood:  angry and anxious  Affect:  initially angry and " anxious. Affect gradually improved as we spoke and she became more euthymic  Speech:  clear, coherent  Psychomotor Behavior:  no evidence of tardive dyskinesia, dystonia, or tics  Thought Process:  linear  Associations:  no loose associations  Thought Content:  no evidence of suicidal ideation or homicidal ideation and no evidence of psychotic thought  Insight:  fair  Judgement:  fair  Oriented to:  time, person, and place  Attention Span and Concentration:  fair  Recent and Remote Memory:  fair  Language: able to name/identify objects without impairment  Fund of Knowledge: intact with awareness of current and past events    Results        Labs Ordered and Resulted from Time of ED Arrival to Time of ED Departure   ROUTINE UA WITH MICROSCOPIC REFLEX TO CULTURE - Abnormal       Result Value    Color Urine Yellow      Appearance Urine Clear      Glucose Urine Negative      Bilirubin Urine Negative      Ketones Urine Negative      Specific Gravity Urine 1.030      Blood Urine Negative      pH Urine 5.5      Protein Albumin Urine 50 (*)     Urobilinogen Urine Normal      Nitrite Urine Negative      Leukocyte Esterase Urine Trace (*)     Mucus Urine Present (*)     RBC Urine 2      WBC Urine 7 (*)     Squamous Epithelials Urine 4 (*)    CBC WITH PLATELETS AND DIFFERENTIAL - Abnormal    WBC Count 5.4      RBC Count 4.55      Hemoglobin 7.3 (*)     Hematocrit 28.2 (*)     MCV 62 (*)     MCH 16.0 (*)     MCHC 25.9 (*)     RDW 19.3 (*)     Platelet Count 398      % Neutrophils 58      % Lymphocytes 30      % Monocytes 9      % Eosinophils 2      % Basophils 1      % Immature Granulocytes 0      NRBCs per 100 WBC 0      Absolute Neutrophils 3.2      Absolute Lymphocytes 1.6      Absolute Monocytes 0.5      Absolute Eosinophils 0.1      Absolute Basophils 0.0      Absolute Immature Granulocytes 0.0      Absolute NRBCs 0.0     DRUG ABUSE SCREEN 77 URINE (FL, RH, SH) - Abnormal    Amphetamines Urine Screen Negative       Barbituates Urine Screen Negative      Benzodiazepine Urine Screen Negative      Cannabinoids Urine Screen Positive (*)     Opiates Urine Screen Negative      PCP Urine Screen Negative      Cocaine Urine Screen Negative     RBC AND PLATELET MORPHOLOGY - Abnormal    Platelet Assessment        Value: Automated Count Confirmed. Platelet morphology is normal.    Elliptocytes Slight (*)     Teardrop Cells Slight (*)     RBC Morphology Confirmed RBC Indices     COVID-19 VIRUS (CORONAVIRUS) BY PCR - Normal    SARS CoV2 PCR Negative     COMPREHENSIVE METABOLIC PANEL - Normal    Sodium 137      Potassium 3.5      Chloride 103      Carbon Dioxide (CO2) 25      Anion Gap 9      Urea Nitrogen 11.8      Creatinine 0.58      Calcium 9.2      Glucose 93      Alkaline Phosphatase 100      AST 24      ALT 13      Protein Total 8.1      Albumin 4.4      Bilirubin Total 0.5      GFR Estimate >90     ETHYL ALCOHOL LEVEL - Normal    Alcohol ethyl <0.01     HCG QUALITATIVE PREGNANCY - Normal    hCG Serum Qualitative Negative         Observation Course   The patient was found to have a psychiatric condition that would benefit from an observation stay in the emergency department for further psychiatric stabilization and/or coordination of a safe disposition. The plan upon observation admission included serial assessments of psychiatric condition, potential administration of medications if indicated, further disposition pending the patient's psychiatric course during the monitoring period.     Serial assessments of the patient's psychiatric condition were performed. Nursing notes were reviewed. During the observation period, the patient did not require medications for agitation, and did not require restraints/seclusion for patient and/or provider safety.     After a period of working with the treatment team on the EmPATH unit, the patient's mental state improved to allow a safe transition to outpatient care. After counseling on the  diagnosis, work-up, and treatment plan, the patient was discharged. Close follow-up with a psychiatrist and/or therapist was recommended and community psychiatric resources were provided. Patient is to return to the ED if any urgent or potentially life-threatening concerns.     Discharge Diagnoses:   Final diagnoses:   Suicidal ideation   Obsessive-compulsive disorder, unspecified type   PTSD (post-traumatic stress disorder)   Auditory hallucination - rule out schizoaffective disorder   WALE (generalized anxiety disorder)   Primary insomnia       Treatment Plan:  -Discontinue Luvox as the patient perceives feeling restless and activated following her first dose yesterday evening.  -Restart Prozac 20 mg daily targeting treatment of her mood and anxiety disorders.  She has been able to tolerate this medication without significant side effects although did question the possibility of weight gain while taking it in conjunction with Zyprexa.  We reviewed that Zyprexa was more likely to culprit to any medication related weight gain that she may have experienced.  We also discussed ways to mitigate weight gain, both pharmacological and behavioral.  -Continue Zyprexa 10 mg nightly for augmentation of Prozac in treating her mood and anxiety disorders in addition to antipsychotic treatment to reduce auditory hallucinations.  The patient should closely monitor for metabolic side effects and weight gain.  We discussed consideration of metformin if this were to become an issue.  -We again reviewed why controlled substances will not be prescribed in the emergency department without having an established provider in the community to coordinate with.  -Begin Rozerem 8 mg nightly for insomnia.  Risks and benefits were reviewed.  -Referral for outpatient medication management and psychotherapy  -Discharge home today.      I spent more than 30 minutes on discharge day activities.    --  Wilfredo Madison MD  [facility]  EMERGENCY DEPT  EmPATH Unit  1/24/2023      Wilfredo Madison MD  01/24/23 0839

## 2023-01-24 NOTE — ED NOTES
Pt.seen by [facility] and psychiatry. Medication ordered and follow up in place. Discharge instructions reviewed with patient including follow-up care plan. Educated on medication regime and advised not to stop prescribed medication without consulting their physician. Reviewed safety plan and outpatient resources.Denies SI. All belongings which where brought into the hospital have been returned to patient. Escorted off the unit at 1400 accompanied [facility] staff. Discharged to home

## 2023-01-24 NOTE — ED NOTES
Pt at the beginning of the shift was upset and feeling like she was not going to be able to sleep. Pt was agitated that she is not able to get Ambien to help with asleep and stated that she can sleep with out it and that its the only medications that helps her depression stops her voices. Pt said if Im going to be here Im going to not sleep and now all I can think about is suicide. Pt stated Im suicidal and would rather not be here anymore. Pt wanted to talk to staff about her SI and pt kept reiterating that she wanted to die. Pt said if I leave here I will kill myself. Pt told stiff she is unable to control the suicidal thoughts and is unsafe. Pt is unable to control her thoughts and has little distress tolerance. Pt continued to feel like none was helping her despite staff offering to sit with her and talk with her. Pt was offer end PRN Zyprexa which she was charli to take. Pt thought did not seems to think that this [employer] would help her. Pt was fairly disorganized in her thoughts and jumped from topic to topic forgetting what staff had said to her at time. Pt was asking to use the Ipad to distract her thoughts. Pt was charli to rest in a sensory room after some reassurance. Pt has been resting throughout the shift in the sensory room. Pt is waning to talk with psychiatry to discuss medications changes. Nursing to continue to monitor.

## 2023-01-24 NOTE — ED NOTES
"[facility] Crisis Reassessment      Marga Chi was reassessed for the following reasons: observation status and disposition planning. Pt was first seen on 1/22/2023 at 2100 by TRACY Avery, SHELTON; see the initial assessment note for details.      Patient Presentation    Initial ED presentation details: Medication refills, worsening mental health symptoms (anxiety, depression, SI, AH)    Current patient presentation: Patient presents with increased anxiety and suicidal ideations this morning due to not sleeping last night and expressing concern about her dog (puppy) at home.  Patient is irritable that \"I have had to wait this long and nothing seems to be being done to help me.\"  Patient is fixate on an interaction last evening with her RN and shares she heard RN tell colleagues \"it's your turn to work with the homeless gal.\"   Patient states she is not homeless and does not feel like that RN provided quality care to her.      Patient states that \"I am more anxious, nervous, and not relaxed than when I came in on Sunday.\"  Patient also verbalizes thoughts to complete suicide when she gets home because of the way she was treated while here.  Patient expresses withholding medications that have been effective and forcing her to sleep on a a mat is not humane treatment.  Patient does present with pressured speech and fast-paced thinking this morning.  Through validation of experience, writer was able to get patient to focus on the moment and what she needs to discharge home safely.     Patient was appreciative of writer's offer to contact [facility] and get appointments scheduled on her behalf.  We also discussed the importance of applying for Medical Assistance insurance.  Patient asked if she could see [facility] psychiatry provider sooner than later so she can get home to take care of her dog.  Patient engaged in discussion surrounding suicidal statements and how it is concerning when she talks " about when not if she would overdose.  Patient does further share that if she were able to sleep, she would not be so fixated on suicide. Patient again accused staff here of pushing her toward thinking about suicide with the way she has been treated so writer worked to focus on the future with getting into the [facility], applying for MA insurance, and getting home to her dog.     Patient was pleased to get an update from normar that she was moved up in line to see the provider.  Writer also provided education about the appointments scheduled through [facility] at 11am phone interview for MA applications and Thursday at 1240 appointment to complete intake assessment then routed to med mgmt and therapy.  Patient's suicidal ideations are decreased with this information and planning.       Risk of Harm    [place] Suicide Severity Rating Scale Full Clinical Version: 1/22/2023  Suicidal Ideation  1. Wish to be Dead (Lifetime): Yes  1. Wish to be Dead (Past 1 Month): Yes  2. Non-Specific Active Suicidal Thoughts (Lifetime): Yes  2. Non-Specific Active Suicidal Thoughts (Past 1 Month): Yes  3. Active Suicidal Ideation with any Methods (Not Plan) Without Intent to Act (Lifetime): Yes  3. Active Suicidal Ideation with any Methods (Not Plan) Without Intent to Act (Past 1 Month): Yes  4. Active Suicidal Ideation with Some Intent to Act, Without Specific Plan (Lifetime): Yes  4. Active Suicidal Ideation with Some Intent to Act, Without Specific Plan (Past 1 Month): No  5. Active Suicidal Ideation with Specific Plan and Intent (Lifetime): Yes  5. Active Suicidal Ideation with Specific Plan and Intent (Past 1 Month): No  Intensity of Ideation  Most Severe Ideation Rating (Lifetime): 5  Most Severe Ideation Rating (Past 1 Month): 3  Frequency (Lifetime): 2-5 times in week  Frequency (Past 1 Month): 2-5 times in week  Duration (Lifetime): Less than 1 hour/some of the time  Duration (Past  1 Month): Less than 1 hour/some of the time  Controllability (Lifetime): Can control thoughts with some difficulty  Controllability (Past 1 Month): Can control thoughts with little difficulty  Deterrents (Lifetime): Deterrents definitely stopped you from attempting suicide  Deterrents (Past 1 Month): Deterrents definitely stopped you from attempting suicide  Reasons for Ideation (Lifetime): Completely to end or stop the pain (You couldn't go on living with the pain or how you were feeling)  Reasons for Ideation (Past 1 Month): Completely to end or stop the pain (You couldn't go on living with the pain or how you were feeling)  Suicidal Behavior  Actual Attempt (Lifetime): Yes  Total Number of Actual Attempts (Lifetime): 1  Actual Attempt (Past 3 Months): No  Has subject engaged in non-suicidal self-injurious behavior? (Lifetime): No  Interrupted Attempts (Lifetime): No  Aborted or Self-Interrupted Attempt (Lifetime): No  Preparatory Acts or Behavior (Lifetime): Yes  Total Number of Preparatory Acts (Lifetime): 1  Preparatory Acts or Behavior (Past 3 Months): No  C-SSRS Risk (Lifetime/Recent)  Calculated C-SSRS Risk Score (Lifetime/Recent): Moderate Risk    [place] Suicide Severity Rating Scale Since Last Contact: 1/24/2023  Suicidal Ideation (Since Last Contact)  1. Wish to be Dead (Since Last Contact): Yes  Wish to be Dead Description (Since Last Contact): overdose on pills due to not getting sleep  2. Non-Specific Active Suicidal Thoughts (Since Last Contact): No  Suicidal Behavior (Since Last Contact)  Actual Attempt (Since Last Contact): No  Has subject engaged in non-suicidal self-injurious behavior? (Since Last Contact): No  Interrupted Attempts (Since Last Contact): No  Aborted or Self-Interrupted Attempt (Since Last Contact): No  Preparatory Acts or Behavior (Since Last Contact): No  Suicide (Since Last Contact): No     C-SSRS Risk (Since Last Contact)  Calculated C-SSRS Risk Score (Since Last Contact): Low  Risk    Validity of evaluation is not impacted by presenting factors during interview.   Environmental or Psychosocial Events: loss of status/respect/rank, bullied/abused, challenging interpersonal relationships, geographic isolation from supports, barriers to accessing healthcare, helplessness/hopelessness, unemployment/underemployment, excessive debt, poor finances, neither working nor attending school and social isolation  Chronic Risk Factors: chronic and ongoing sleep difficulties and parental mental health issue   Warning Signs: talking or writing about death, dying, or suicide, hopelessness, feeling trapped, like there is no way out, withdrawing from friends, family, and society, anxiety, agitation, unable to sleep, sleeping all the time, dramatic changes in mood and no reason for living, no sense of purpose in life  Protective Factors: responsibilities and duties to others, including pets and children, lives in a responsibly safe and stable environment, help seeking and sense of belonging  Interpretation of Risk Scoring, Risk Mitigation Interventions and Safety Plan:  Patient is talking about suicide as a means to get some sleep since we have not been prescribing her Ambien.  She has reported a plan to take her saved up Seroquel at home.  Patient also is forward thinking and future oriented when talking about needing to take care of her dog who has been home alone since Sunday and wanting to scheduled outpatient provider appointments.  Patient has become more calmed since meeting with provider and working toward discharge.     Does the patient have thoughts of harming others? No    Mental Status Exam   Affect: Appropriate   Appearance: Appropriate    Attention Span/Concentration: Attentive?    Eye Contact: Engaged   Fund of Knowledge: Appropriate    Language /Speech Content: Fluent   Language /Speech Volume: Normal    Language /Speech Rate/Productions: Hyperverbal and Pressured    Recent Memory: Intact    Remote Memory: Intact   Mood: Anxious    Orientation to Person: Yes    Orientation to Place: Yes   Orientation to Time of Day: Yes    Orientation to Date: Yes    Situation (Do they understand why they are here?): Yes    Psychomotor Behavior: Normal    Thought Content: Clear and Suicidal   Thought Form: Goal Directed       Additional Collateral Information   No emergency contact on file and reports not being close to any family or friends to reach out to      Therapeutic Intervention  The following therapeutic methodologies were employed when working with the patient: Establishing rapport, Active listening, Assess dimensions of crisis, Apply solution-focused therapy to address current crisis, Establish a discharge plan and Brief Supportive Therapy. Patient response to intervention: engaged.    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  She reports increased suicidal thoughts as a result of inability to get some sleep here on the unit.  Patient expresses the need to discharge home as soon as possible so she can take care of her dog who has been home for almost two days without supervision or care.  Patient was set up with an intake appointment at [facility] for Thursday and they will also assist her in applying for Medical Assistance insurance. Patient will be discharged home today.     Plan:    Disposition  Recommended disposition: Individual Therapy and Medication Management      Reviewed case and recommendations with attending provider. Attending Name: Gricelda      Attending concurs with disposition:  Yes      Patient concurs with disposition: Yes      Final disposition: Individual therapy  and Medication management.         Assessment Details  Total duration spent on the patient case in minutes: .75 hrs     CPT code(s) utilized: 37671 - Psychotherapy (with patient) - 60 (53+*) min       Maximiliano Del Rio, KEVINSW, MSW  Callback: [phone]      Aftercare Plan  If I am feeling unsafe or I am in a crisis, I will:   Contact my established care providers   Call the National Suicide Prevention Lifeline: 988  Go to the nearest emergency room   Call 911     Warning signs that I or other people might notice when a crisis is developing for me: not getting sleep, bad, irritable mood, upset    Things I am able to do on my own to cope or help me feel better: take a walk, play games on iPad, watch TV, listen to music, grounding techniques (see handout)    Things that I am able to do with others to cope or help me better: be alone and isolate     Things I can use or do for distraction: movies, music     Changes I can make to support my mental health and wellness: stay healthy, take medications, and take walks with my dog     People in my life that I can ask for help: Mj (maybe)     Your [facility] has a mental health crisis team you can call 24/7: [facility] Mobile Crisis  [phone]     Other things that are important when I'm in crisis: get sleep, drink calming tea, listen to easy music     Additional resources and information: [facility] for providers      Crisis Lines  Crisis Text Line  Text 210050  You will be connected with a trained live crisis counselor to provide support.  Por espanol, texto  DEAN a 096399 o texto a 442-AYUDAME en WhatsApp  The Jerardo Project (LGBTQ Youth Crisis Line)  1.609.147.0156  text START to 230-148    Community Resources  Fast Tracker  Linking people to mental health and substance use disorder resources  Mont[employer] Talentn.org   [facility]  "Line  Peer to peer support  Monday thru Saturday, 12 pm to 10 pm  016.124.5557 or 5.674.991.7414  Text \"Support\" to 90767  National [place] on Mental Illness (FREDDY)  541.438.3780 or 1.888.FREDDY.HELPS    Mental Health Apps  My3  https://Beta Dashpp.org/  VirtualHopeBox  https://Yobble/apps/virtual-hope-box/    Additional Information  Today you were seen by a licensed mental health professional through Triage and Transition services, Behavioral Healthcare Providers ([facility])  for a crisis assessment in the Emergency Department at [facility].  It is recommended that you follow up with your established providers (psychiatrist, mental health therapist, and/or primary care doctor - as relevant) as soon as possible. Coordinators from [facility] will be calling you in the next 24-48 hours to ensure that you have the resources you need.  You can also contact [facility] coordinators directly at [phone]. You may have been scheduled for or offered an appointment with a mental health provider. [facility] maintains an extensive network of licensed behavioral health providers to connect patients with the services they need.  We do not charge providers a fee to participate in our referral network.  We match patients with providers based on a patient's specific needs, insurance coverage, and location.  Our first effort will be to refer you to a provider within your care system, and will utilize providers outside your care system as needed.      "

## 2023-01-24 NOTE — ED PROVIDER NOTES
Patient briefly seen at her request. She had approached the nurse's station requesting Ambien. She was informed that this writer would not ordering Ambien for her this evening as Dr. Madison had discontinued it and encouraged follow-up with her outpatient providers to determine whether clonazepam and Ambien should be part of her treatment plan. Patient reported that she will not be able to sleep without Ambien as she has tried numerous other medications that do not work. She states that if she is unable to sleep tonight, she will come out of the sensory room and become loud. Reports that security will need to be called and it has happened in the past. She was informed that we would call security if that were to occur and she would be transferred back to the emergency department.     Patient does not appear to meet criteria for an involuntary hold at this time and can be discharged if she does not desire to stay here tonight.      Nakul Levi, CNP  01/23/23 9796

## 2023-01-24 NOTE — DISCHARGE INSTRUCTIONS
[facility]  [address] [place] [address]  Phone: [phone]    They will call you at 11am on Wednesday, January 25 to assist in applying for [place] Medical Assistance over the phone.    While MA is pending, you can still utilize Medical Transportation services to get to and from appointments.     On Thursday, January 26, you will go to their office on the 5th floor at the address above to meet with Luci Beach to complete intake assessment and get scheduled with medication management provider and therapy.  You will need to arrive no later than 12:40pm for this appointment.       Aftercare Plan  If I am feeling unsafe or I am in a crisis, I will:   Contact my established care providers   Call the National Suicide Prevention Lifeline: 988  Go to the nearest emergency room   Call 911     Warning signs that I or other people might notice when a crisis is developing for me: not getting sleep, bad, irritable mood, upset    Things I am able to do on my own to cope or help me feel better: take a walk, play games on iPad, watch TV, listen to music, grounding techniques (see handout)    Things that I am able to do with others to cope or help me better: be alone and isolate     Things I can use or do for distraction: movies, music     Changes I can make to support my mental health and wellness: stay healthy, take medications, and take walks with my dog     People in my life that I can ask for help: Mj (maybe)     Your [facility] has a mental health crisis team you can call 24/7: [facility] Mobile Crisis  [phone]     Other things that are important when I'm in crisis: get sleep, drink calming tea, listen to easy music     Additional resources and information: [facility] for providers      Crisis Lines  Crisis Text Line  Text 068995  You will be connected with a trained live crisis counselor to provide support.  jules Davis a 102271 o  "kandio a 442-AYELIZABETHME en Whatjaneth  The Jerardo Project (LGBTQ Youth Crisis Line)  1.483.617.9392  text START to 626-623    Community Resources  Fast Tracker  Linking people to mental health and substance use disorder resources  Invoiceable.Gr[employer]ite Networks   [place] Mental Health Warm Line  Peer to peer support  Monday thru Saturday, 12 pm to 10 pm  161.104.0218 or 3.922.414.4231  Text \"Support\" to 59492  National [place] on Mental Illness (FREDDY)  755.213.1267 or 1.888.FREDDY.HELPS    Mental Health Apps  My3  https://[place].org/  VirtualHopeBox  https://Ecolibrium/apps/virtual-hope-box/    Additional Information  Today you were seen by a licensed mental health professional through Triage and Transition services, Behavioral Healthcare Providers ([facility])  for a crisis assessment in the Emergency Department at [facility].  It is recommended that you follow up with your established providers (psychiatrist, mental health therapist, and/or primary care doctor - as relevant) as soon as possible. Coordinators from [facility] will be calling you in the next 24-48 hours to ensure that you have the resources you need.  You can also contact [facility] coordinators directly at [phone]. You may have been scheduled for or offered an appointment with a mental health provider. [facility] maintains an extensive network of licensed behavioral health providers to connect patients with the services they need.  We do not charge providers a fee to participate in our referral network.  We match patients with providers based on a patient's specific needs, insurance coverage, and location.  Our first effort will be to refer you to a provider within your care system, and will utilize providers outside your care system as needed.    "

## 2023-04-15 ENCOUNTER — APPOINTMENT (OUTPATIENT)
Dept: GENERAL RADIOLOGY | Facility: CLINIC | Age: 48
End: 2023-04-15
Attending: EMERGENCY MEDICINE
Payer: MEDICARE

## 2023-04-15 ENCOUNTER — HOSPITAL ENCOUNTER (EMERGENCY)
Facility: CLINIC | Age: 48
Discharge: HOME | End: 2023-04-15
Attending: EMERGENCY MEDICINE | Admitting: EMERGENCY MEDICINE
Payer: MEDICARE

## 2023-04-15 VITALS
BODY MASS INDEX: 26.46 KG/M2 | HEART RATE: 84 BPM | SYSTOLIC BLOOD PRESSURE: 101 MMHG | WEIGHT: 155 LBS | RESPIRATION RATE: 13 BRPM | DIASTOLIC BLOOD PRESSURE: 60 MMHG | OXYGEN SATURATION: 99 % | HEIGHT: 64 IN

## 2023-04-15 DIAGNOSIS — R42 LIGHTHEADEDNESS: ICD-10-CM

## 2023-04-15 DIAGNOSIS — R05.9 COUGH, UNSPECIFIED TYPE: ICD-10-CM

## 2023-04-15 DIAGNOSIS — R07.9 CHEST PAIN, UNSPECIFIED TYPE: ICD-10-CM

## 2023-04-15 DIAGNOSIS — F20.9 SCHIZOPHRENIA, UNSPECIFIED TYPE (H): ICD-10-CM

## 2023-04-15 LAB
ANION GAP SERPL CALCULATED.3IONS-SCNC: 12 MMOL/L (ref 7–15)
BASOPHILS # BLD AUTO: 0 10E3/UL (ref 0–0.2)
BASOPHILS NFR BLD AUTO: 1 %
BUN SERPL-MCNC: 4.9 MG/DL (ref 6–20)
CALCIUM SERPL-MCNC: 9.2 MG/DL (ref 8.6–10)
CHLORIDE SERPL-SCNC: 101 MMOL/L (ref 98–107)
CREAT SERPL-MCNC: 0.57 MG/DL (ref 0.51–0.95)
DACRYOCYTES BLD QL SMEAR: SLIGHT
DEPRECATED HCO3 PLAS-SCNC: 27 MMOL/L (ref 22–29)
ELLIPTOCYTES BLD QL SMEAR: ABNORMAL
EOSINOPHIL # BLD AUTO: 0 10E3/UL (ref 0–0.7)
EOSINOPHIL NFR BLD AUTO: 0 %
ERYTHROCYTE [DISTWIDTH] IN BLOOD BY AUTOMATED COUNT: 20.5 % (ref 10–15)
FRAGMENTS BLD QL SMEAR: ABNORMAL
GFR SERPL CREATININE-BSD FRML MDRD: >90 ML/MIN/1.73M2
GLUCOSE SERPL-MCNC: 101 MG/DL (ref 70–99)
HCT VFR BLD AUTO: 31.4 % (ref 35–47)
HGB BLD-MCNC: 8.2 G/DL (ref 11.7–15.7)
HOLD SPECIMEN: NORMAL
HOLD SPECIMEN: NORMAL
IMM GRANULOCYTES # BLD: 0 10E3/UL
IMM GRANULOCYTES NFR BLD: 0 %
LYMPHOCYTES # BLD AUTO: 0.8 10E3/UL (ref 0.8–5.3)
LYMPHOCYTES NFR BLD AUTO: 17 %
MCH RBC QN AUTO: 15.6 PG (ref 26.5–33)
MCHC RBC AUTO-ENTMCNC: 26.1 G/DL (ref 31.5–36.5)
MCV RBC AUTO: 60 FL (ref 78–100)
MONOCYTES # BLD AUTO: 0.2 10E3/UL (ref 0–1.3)
MONOCYTES NFR BLD AUTO: 3 %
NEUTROPHILS # BLD AUTO: 3.4 10E3/UL (ref 1.6–8.3)
NEUTROPHILS NFR BLD AUTO: 79 %
NRBC # BLD AUTO: 0 10E3/UL
NRBC BLD AUTO-RTO: 0 /100
PLAT MORPH BLD: ABNORMAL
PLATELET # BLD AUTO: 449 10E3/UL (ref 150–450)
POTASSIUM SERPL-SCNC: 2.9 MMOL/L (ref 3.4–5.3)
RBC # BLD AUTO: 5.24 10E6/UL (ref 3.8–5.2)
RBC MORPH BLD: ABNORMAL
SARS-COV-2 RNA RESP QL NAA+PROBE: NEGATIVE
SODIUM SERPL-SCNC: 140 MMOL/L (ref 136–145)
TROPONIN T SERPL HS-MCNC: <6 NG/L
WBC # BLD AUTO: 4.4 10E3/UL (ref 4–11)

## 2023-04-15 PROCEDURE — U0003 INFECTIOUS AGENT DETECTION BY NUCLEIC ACID (DNA OR RNA); SEVERE ACUTE RESPIRATORY SYNDROME CORONAVIRUS 2 (SARS-COV-2) (CORONAVIRUS DISEASE [COVID-19]), AMPLIFIED PROBE TECHNIQUE, MAKING USE OF HIGH THROUGHPUT TECHNOLOGIES AS DESCRIBED BY CMS-2020-01-R: HCPCS | Performed by: EMERGENCY MEDICINE

## 2023-04-15 PROCEDURE — 99285 EMERGENCY DEPT VISIT HI MDM: CPT | Mod: 25,CS

## 2023-04-15 PROCEDURE — 90791 PSYCH DIAGNOSTIC EVALUATION: CPT

## 2023-04-15 PROCEDURE — 250N000013 HC RX MED GY IP 250 OP 250 PS 637: Performed by: EMERGENCY MEDICINE

## 2023-04-15 PROCEDURE — 85025 COMPLETE CBC W/AUTO DIFF WBC: CPT | Performed by: EMERGENCY MEDICINE

## 2023-04-15 PROCEDURE — 84484 ASSAY OF TROPONIN QUANT: CPT | Performed by: EMERGENCY MEDICINE

## 2023-04-15 PROCEDURE — 36415 COLL VENOUS BLD VENIPUNCTURE: CPT | Performed by: EMERGENCY MEDICINE

## 2023-04-15 PROCEDURE — 80048 BASIC METABOLIC PNL TOTAL CA: CPT | Performed by: EMERGENCY MEDICINE

## 2023-04-15 PROCEDURE — 71045 X-RAY EXAM CHEST 1 VIEW: CPT

## 2023-04-15 RX ORDER — POTASSIUM CHLORIDE 1500 MG/1
40 TABLET, EXTENDED RELEASE ORAL ONCE
Status: COMPLETED | OUTPATIENT
Start: 2023-04-15 | End: 2023-04-15

## 2023-04-15 RX ORDER — LORAZEPAM 1 MG/1
1 TABLET ORAL ONCE
Status: COMPLETED | OUTPATIENT
Start: 2023-04-15 | End: 2023-04-15

## 2023-04-15 RX ADMIN — LORAZEPAM 1 MG: 1 TABLET ORAL at 10:54

## 2023-04-15 RX ADMIN — POTASSIUM CHLORIDE 40 MEQ: 1500 TABLET, EXTENDED RELEASE ORAL at 13:50

## 2023-04-15 ASSESSMENT — COLUMBIA-SUICIDE SEVERITY RATING SCALE - C-SSRS
TOTAL  NUMBER OF INTERRUPTED ATTEMPTS SINCE LAST CONTACT: NO
1. SINCE LAST CONTACT, HAVE YOU WISHED YOU WERE DEAD OR WISHED YOU COULD GO TO SLEEP AND NOT WAKE UP?: NO
TOTAL  NUMBER OF ABORTED OR SELF INTERRUPTED ATTEMPTS SINCE LAST CONTACT: NO
6. HAVE YOU EVER DONE ANYTHING, STARTED TO DO ANYTHING, OR PREPARED TO DO ANYTHING TO END YOUR LIFE?: NO
2. HAVE YOU ACTUALLY HAD ANY THOUGHTS OF KILLING YOURSELF?: NO
ATTEMPT SINCE LAST CONTACT: NO
SUICIDE, SINCE LAST CONTACT: NO

## 2023-04-15 ASSESSMENT — ACTIVITIES OF DAILY LIVING (ADL)
ADLS_ACUITY_SCORE: 35
ADLS_ACUITY_SCORE: 35

## 2023-04-15 NOTE — ED TRIAGE NOTES
Pt called 911 today with sob and chest pain  Pt also complained dizziness and walked out to ems    Dizziness prior issue 2 years ago     bgl [id number]    Pt has not eaten in 2 days   Pt has not taken schizophrenia meds for the last 2 weeks

## 2023-04-15 NOTE — DISCHARGE INSTRUCTIONS
[facility] Behavioral Health/Initial Contact and Access/Case Managemenet   Address [address] [place] [address]   Phone [phone]   Fax   [phone]  Email [email or web address]    Continue working with providers (therapy and medication provider) at [facility]    [facility]  [email or web address]  [address], [address]  [place] [address]  [email or web address]  Phone: [phone]    [facility]  [address]  [place] [address] [phone]    Aftercare Plan  If I am feeling unsafe or I am in a crisis, I will:   Contact my established care providers   Call the National Suicide Prevention Lifeline: 988  Go to the nearest emergency room   Call 911     -I will abstain from all mood altering chemicals not currently prescribed to me       -I will attend scheduled mental health therapy and psychiatric appointments and follow all recommendations      -I will commit to 30 minutes of self care daily - this can be as simple as taking a shower, going for a walk, cooking a meal, read, writing, etc      -I will practice square breathing when I begin to feel anxious - in breath through the nose for the count of 4 and the first line on the square. Out breath through the mouth for the count of 4 for the second line of the square. Repeat to complete the square. Repeat the square as many times as needed.      - I will use distraction skills of: going for walks, watching TV, spending time outside, calling a friend or family member      -Use community resources, including hotline numbers, [facility] crisis and support meetings      -Maintain a daily schedule/routine      -Practice deep breathing skills      -Download a meditation huey and spend 15-20 minutes per day mediating/relaxing. Some apps to download include: Calm, Headspace and Insight Timer. All 3 of these apps have free version     Your [facility] has a mental health crisis  "team you can call 24/7: [facility] Mobile Crisis  [phone]     Additional resources and information: continue with [facility], reach out to [facility] for Access worker assigned        Crisis Lines  Crisis Text Line  Text 802067  You will be connected with a trained live crisis counselor to provide support.  Por espanol, texto  DEAN a 261953 o texto a 442-AYUDAME en WhatsApp  The Jerardo Project (LGBTQ Youth Crisis Line)  1.160.590.6897  text START to 642-160    [place]  Fast Tracker  Linking people to mental health and substance use disorder resources  Quintiles.mySBX   [place] Mental Health Warm Line  Peer to peer support  Monday thru Saturday, 12 pm to 10 pm  301.484.6118 or 0.091.751.6170  Text \"Support\" to 43747  National [place] on Mental Illness (FREDDY)  907.435.7348 or 1.888.FREDDY.HELPS    Mental Health Apps  My3  https://The Idealists.org/  VirtualHopeBox  https://"[employer]"/apps/virtual-hope-box/    Additional Information  Today you were seen by a licensed mental health professional through Triage and Transition services, Behavioral Healthcare Providers ([facility])  for a crisis assessment in the Emergency Department at [facility].  It is recommended that you follow up with your established providers (psychiatrist, mental health therapist, and/or primary care doctor - as relevant) as soon as possible. Coordinators from [facility] will be calling you in the next 24-48 hours to ensure that you have the resources you need.  You can also contact [facility] coordinators directly at [phone]. You may have been scheduled for or offered an appointment with a mental health provider. [facility] maintains an extensive network of licensed behavioral health providers to connect patients with the services they need.  We do not charge providers a fee to participate in our referral network.  We match patients with providers based on a patient's specific needs, insurance coverage, " and location.  Our first effort will be to refer you to a provider within your care system, and will utilize providers outside your care system as needed.

## 2023-04-15 NOTE — CONSULTS
Diagnostic Evaluation Consultation  Crisis Assessment    Patient was assessed: In Person  Patient location: [facility] Emergency Department - ED11  Was a release of information signed: No. Reason: declined      Referral Data and Chief Complaint  Marga is a 47 year old, who uses she/her pronouns, and presents to the ED via EMS. Patient is referred to the ED by self. Patient is presenting to the ED for the following concerns: SOB, chest pain, dizziness, and no psychiatric medications in two weeks.      Informed Consent and Assessment Methods     Patient is her own guardian. Writer met with patient and explained the crisis assessment process, including applicable information disclosures and limits to confidentiality, assessed understanding of the process, and obtained consent to proceed with the assessment. Patient was observed to be able to participate in the assessment as evidenced by acknowledged role of writer and purpose of assessment, engaged in answered most assessment questions, and explored disposition options. Assessment methods included conducting a formal interview with patient, review of medical records, collaboration with medical staff, and obtaining relevant collateral information from family and community providers when available..     Over the course of this crisis assessment provided reassurance, offered validation, engaged patient in problem solving and disposition planning, worked with patient on safety and aftercare planning, worked with patient on brief, therapeutic activity: rapport development, distress tolerance, assisted in processing patient's thoughts and feeling relating to frustration in not getting medications being requested, refer back to community providers, and establishing boundaries of care and provided psychoeducation. Patient's response to interventions was engaged, mostly cooperative yet unhappy with recommendations.      Summary of Patient Situation     Patient independently  "called EMS with chief complaint of dizziness, SOB, and lightheadedness then informed ED staff of being \"extremely anxious\" due to not taking her psychiatric medications for two weeks.  Patient got a dose of Ativan in the ED prior to crisis assessment and reports that has helped her be less jittery and upset.  Patient is very vague when asked how long her anxiety has been unmanageable as she states \"when the meds left my body.\"  She also reports facing imminent homelessness as her funds are becoming limited, \"only getting $900 from social security\", and her parents are no longer willing to financially support.       Per chart review, patient has been meeting with therapy and psychiatry at [facility] since January 2023 discharge from [facility] and reports not being happy with that provider because \"they are not prescribing anything to treat my panic.\"   Patient is prescribed Gabapentin 100mg TID PRN and later shares that she is not taking that \"because it is for people with seizures and nerve pain.\"  Patient was provided education about the off-label use of that for anxiety.     In a telephone note from Kylah Roberto RN, at [facility]:  \"Informed Marga about what Dr. Headley was offering.  She states \"I am really afraid of taking buspirone, I had a friend who took it and they felt really dizzy and actually passed out on the floor.  I don't want to take something I don't feel safe with, I would really rather take something I have experience with and I only want it for a short time to get me through this.  Can't you ask  again, let her know how much I am suffering\"  Informed Marga that Dr. Headley was given her description of her symptoms and the circumstances of her life at present.  Informed aMrga that benzodiazepines have side effects as well, and have a risk for dependence.  Marga does not want to take the Buspirone until one more attempt to " "request a benzodiazapine has been made with Dr. Okeefe.  Informed Marga that her request would be forwarded to Dr. Headley, but that it is unlikely a change will be made.\"    Patient describe her mood as depressed, \"miserable\", anxiety, easily overwhelmed, hypervigilant.  She complains of not sleeping well due to her anxiety yet later states she is taking her Ambien which helps her sleep.  Patient reports decreased appetite.  She also reports experiencing hallucinations where she does not know if what she is seeing/thinking is reality or not \"because I have not been sleeping.\"  Patient also endorses paranoia of being followed by a white van in her neighborhood when she goes for a walk or sees headlights outside her apartment building when she always gets up at 0400 to use the bathroom.      Patient denies active suicidal ideations and notes sometimes having thoughts of wishing this could end or go to sleep and not wake; no plans are identified either due to \"not having a way to act on it.\"  Patient asks if she can stay here [in the ED] for a while to \"stabilize, eat and talk to people.\"  Writer asked about the dog at home since she was worried about the dog's safety during the January [facility] encounter, patient smiled and states she left six bowls of water and four bowls of food for the dog.      Brief Psychosocial History     Patient lives alone with a dog in a [place] apartment.  She is worried about being evicted soon due to not being able to financially afford the apartment anymore.  Patient reports that her parents are withdrawing or decreasing their financial support which adds to the anxiety and stress she is experiencing.  Patient mentions having a \"horrible\" conversation with her parents a couple of days ago.     Patient is not working nor going to school.  Her income is social security disability.     Not a ; no hobbies; no supports; no legal issues; no cultural/spiritual needs. "     Significant Clinical History     Patient shares her diagnostic history to include: Schizophrenia, Panic Disorder, social anxiety, generalized anxiety, depression, and severe OCD.      Patient reports working with a psychiatrist in [place] for 10 years and tried calling him recently to get a prescription and he declined due to being out of state. She also tried calling a former psychiatrist in [place] who did not answer her call.  Patient is currently set up with providers for therapy and medications at [facility].     Only record in her chart of another psychiatry encounter is when patient was on [facility] in January 2023 for 2 days.      Collateral Information    Writer left message for patient's mother, Sabrina, without PHI requesting a phone call back.      Risk Assessment  [place] Suicide Severity Rating Scale Full Clinical Version: 1/22/2023  Suicidal Ideation  1. Wish to be Dead (Lifetime): Yes  1. Wish to be Dead (Past 1 Month): Yes  2. Non-Specific Active Suicidal Thoughts (Lifetime): Yes  2. Non-Specific Active Suicidal Thoughts (Past 1 Month): Yes  3. Active Suicidal Ideation with any Methods (Not Plan) Without Intent to Act (Lifetime): Yes  3. Active Suicidal Ideation with any Methods (Not Plan) Without Intent to Act (Past 1 Month): Yes  4. Active Suicidal Ideation with Some Intent to Act, Without Specific Plan (Lifetime): Yes  4. Active Suicidal Ideation with Some Intent to Act, Without Specific Plan (Past 1 Month): No  5. Active Suicidal Ideation with Specific Plan and Intent (Lifetime): Yes  5. Active Suicidal Ideation with Specific Plan and Intent (Past 1 Month): No  Intensity of Ideation  Most Severe Ideation Rating (Lifetime): 5  Most Severe Ideation Rating (Past 1 Month): 3  Frequency (Lifetime): 2-5 times in week  Frequency (Past 1 Month): 2-5 times in week  Duration (Lifetime): Less than 1 hour/some of the time  Duration (Past 1 Month): Less than 1 hour/some of the  time  Controllability (Lifetime): Can control thoughts with some difficulty  Controllability (Past 1 Month): Can control thoughts with little difficulty  Deterrents (Lifetime): Deterrents definitely stopped you from attempting suicide  Deterrents (Past 1 Month): Deterrents definitely stopped you from attempting suicide  Reasons for Ideation (Lifetime): Completely to end or stop the pain (You couldn't go on living with the pain or how you were feeling)  Reasons for Ideation (Past 1 Month): Completely to end or stop the pain (You couldn't go on living with the pain or how you were feeling)  Suicidal Behavior  Actual Attempt (Lifetime): Yes  Total Number of Actual Attempts (Lifetime): 1  Actual Attempt (Past 3 Months): No  Has subject engaged in non-suicidal self-injurious behavior? (Lifetime): No  Interrupted Attempts (Lifetime): No  Aborted or Self-Interrupted Attempt (Lifetime): No  Preparatory Acts or Behavior (Lifetime): Yes  Total Number of Preparatory Acts (Lifetime): 1  Preparatory Acts or Behavior (Past 3 Months): No  C-SSRS Risk (Lifetime/Recent)  Calculated C-SSRS Risk Score (Lifetime/Recent): Moderate Risk        [place] Suicide Severity Rating Scale Since Last Contact: 4/15/2023  Suicidal Ideation (Since Last Contact)  1. Wish to be Dead (Since Last Contact): No  2. Non-Specific Active Suicidal Thoughts (Since Last Contact): No  Suicidal Behavior (Since Last Contact)  Actual Attempt (Since Last Contact): No  Has subject engaged in non-suicidal self-injurious behavior? (Since Last Contact): No  Interrupted Attempts (Since Last Contact): No  Aborted or Self-Interrupted Attempt (Since Last Contact): No  Preparatory Acts or Behavior (Since Last Contact): No  Suicide (Since Last Contact): No     C-SSRS Risk (Since Last Contact)  Calculated C-SSRS Risk Score (Since Last Contact): No Risk Indicated    Validity of evaluation is not impacted by presenting factors during interview.   Comments regarding subjective  versus objective responses to [place] tool: n/a  Environmental or Psychosocial Events: work or task failure, challenging interpersonal relationships, geographic isolation from supports, barriers to accessing healthcare, helplessness/hopelessness, unemployment/underemployment, unstable housing, homelessness, excessive debt, poor finances, other use of prescription medication, neither working nor attending school and social isolation  Chronic Risk Factors: history of suicide attempts (in her 20s), chronic and ongoing sleep difficulties and personality disorders   Warning Signs: talking or writing about death, dying, or suicide, hopelessness, pain (new or worsening), feeling trapped, like there is no way out, anxiety, agitation, unable to sleep, sleeping all the time, engaging in self-destructive behavior and recent discharges from emergency department or inpatient psychiatric care  Protective Factors: responsibilities and duties to others, including pets and children, lives in a responsibly safe and stable environment and help seeking  Interpretation of Risk Scoring, Risk Mitigation Interventions and Safety Plan:  Patient scores no risk due to denying any current suicidal ideations, recognizes her dog is a protective factor and finding providers who believe her need for medication to address a panic disorder.      Does the patient have thoughts of harming others? No     Is the patient engaging in sexually inappropriate behavior?  no        Current Substance Abuse     Is there recent substance abuse? no     Was a urine drug screen or blood alcohol level obtained: No       Mental Status Exam     Affect: Dramatic   Appearance: Disheveled  Unwashed hair, wearing hospital gown that was half way down chest, scabs and pick marks over forehead, hands, and legs  Attention Span/Concentration: Attentive  Eye Contact: Engaged   Fund of Knowledge: Appropriate    Language /Speech Content: Fluent   Language /Speech Volume: Normal     Language /Speech Rate/Productions: Hyperverbal    Recent Memory: Intact   Remote Memory: Intact   Mood: Anxious and Sad    Orientation to Person: Yes    Orientation to Place: Yes   Orientation to Time of Day: Yes    Orientation to Date: Yes    Situation (Do they understand why they are here?): Yes    Psychomotor Behavior: Normal    Thought Content: Other: medication seeking   Thought Form: Tangential      History of commitment: No       Medication    Psychotropic medications:   No current facility-administered medications for this encounter.     Current Outpatient Medications   Medication     FLUoxetine (PROZAC) 20 MG capsule     OLANZapine (ZYPREXA) 10 MG tablet     ramelteon (ROZEREM) 8 MG tablet     Medication changes made in the last two weeks: No       Current Care Team    Primary Care Provider: No  Psychiatrist: Yes. Name: Jessica Headley MD. Location: [facility]. Date of last visit: 3/29/2023. Frequency: as needed but monthly. Perceived helpfulness: not helpful, pt does not want to continue care with her.  Therapist: Yes. Name: Emilee Toledo [facility]. Location: [facility]. Date of last visit: 4/06/2023. Frequency: every other week. Perceived helpfulness: not helpful.  : being referred to one     CTSS or ARMHS: No  ACT Team: No  Other: No      Diagnosis    295.90  (F20.9) Schizophrenia   300.01 (F41.0) Panic Disorder  300.02 (F41.1) Generalized Anxiety Disorder    296.32 (F33.1) Major Depressive Disorder, Recurrent Episode, Moderate _ and With anxious distress  300.3 (F42) Obsessive Compulsive Disorder      Clinical Summary and Substantiation of Recommendations    Patient presents with primary physical complaints of dizziness, SOB, lightheadedness and later adds anxiety without access to medications.  She was given a dose of Ativan in the ED, declined [facility] transfer, and requesting help with housing.  Patient participates in the  crisis assessment with vague responses, disjointed stories and incongruent information regarding providers.  Patient asks writer for resources for private pay psychiatrists and housing stabilization resources.  Patient continues to ask for more Ativan as discharge plans are being reviewed and is upset that no one is treating her panic disorder.  Patient admits to having Ambien at home that works for sleep and identifies not having any of the conditions for which Gabapentin in prescribed.  Writer informed patient that anxiety management is an off-label for Gabapentin and the providers here would recommended trying that as prescribed by outpatient provided. Discharged home.     Disposition    Recommended disposition: Individual Therapy and Medication Management       Reviewed case and recommendations with attending provider. Attending Name: Brandi       Attending concurs with disposition: Yes       Patient and/or validated legal guardian concurs with disposition: No: patient wants to stay in the ED to stabilize, get something to eat, talk with someone about anxiety.         Final disposition: Individual therapy  and Medication management.     Outpatient Details (if applicable):   Aftercare plan and appointments placed in the AVS and provided to patient: Yes. Given to patient by ED RN    Was lethal means counseling provided as a part of aftercare planning? No;       Assessment Details    Patient interview started at: 1230 and completed at: 1300.     Total duration spent on the patient case in minutes: .75 hrs      CPT code(s) utilized: 72654 - Psychotherapy for Crisis - 60 (30-74*) min       SHELTON Gatica, TRACY   DEC - Triage & Transition Services  Callback: [phone]      Aftercare Plan  If I am feeling unsafe or I am in a crisis, I will:   Contact my established care providers   Call the National Suicide Prevention Lifeline: 988  Go to the nearest emergency room   Call 911     -I will abstain from all  mood altering chemicals not currently prescribed to me       -I will attend scheduled mental health therapy and psychiatric appointments and follow all recommendations      -I will commit to 30 minutes of self care daily - this can be as simple as taking a shower, going for a walk, cooking a meal, read, writing, etc      -I will practice square breathing when I begin to feel anxious - in breath through the nose for the count of 4 and the first line on the square. Out breath through the mouth for the count of 4 for the second line of the square. Repeat to complete the square. Repeat the square as many times as needed.      - I will use distraction skills of: going for walks, watching TV, spending time outside, calling a friend or family member      -Use community resources, including hotline numbers, [facility] crisis and support meetings      -Maintain a daily schedule/routine      -Practice deep breathing skills      -Download a meditation huey and spend 15-20 minutes per day mediating/relaxing. Some apps to download include: Calm, Headspace and Insight Timer. All 3 of these apps have free version     Your [facility] has a mental health crisis team you can call 24/7: [facility] Mobile Crisis  [phone]     Additional resources and information: continue working with [facility] providers for therapy and psychiatry; reach out to Adult Access worker to support with applying for MA, SNAP, housing stabilization      Crisis Lines  Crisis Text Line  Text 594596  You will be connected with a trained live crisis counselor to provide support.  Por kyle, texto  DEAN a 735697 o texto a 442-AYUDAME en WhatsApp  The [employer] Project (LGBTQ Youth Crisis Line)  2.419.135.9317  text START to 817-337    Community Resources  Fast Tracker  Linking people to mental health and substance use disorder resources  fasttrackermn.org   [place] Mental Health Warm Line  Peer to peer support  Monday thru  "Saturday, 12 pm to 10 pm  090.910.1411 or 1.037.805.5394  Text \"Support\" to 84992  National [place] on Mental Illness (FREDDY)  355.342.3651 or 1.888.FREDDY.HELPS    Mental Health Apps  My3  https://WorkAmericapp.org/  VirtualHopeBox  https://DISKOVRe/apps/virtual-hope-box/    Additional Information  Today you were seen by a licensed mental health professional through Triage and Transition services, [facility] ([facility])  for a crisis assessment in the Emergency Department at [facility].  It is recommended that you follow up with your established providers (psychiatrist, mental health therapist, and/or primary care doctor - as relevant) as soon as possible. Coordinators from [facility] will be calling you in the next 24-48 hours to ensure that you have the resources you need.  You can also contact [facility] coordinators directly at [phone]. You may have been scheduled for or offered an appointment with a mental health provider. [facility] maintains an extensive network of licensed behavioral health providers to connect patients with the services they need.  We do not charge providers a fee to participate in our referral network.  We match patients with providers based on a patient's specific needs, insurance coverage, and location.  Our first effort will be to refer you to a provider within your care system, and will utilize providers outside your care system as needed.            "

## 2023-04-15 NOTE — ED PROVIDER NOTES
"  History     Chief Complaint:  Dizziness       HPI   Marga Chi is a 47 year old female with a history of schizophrenia who presents today with multiple complaints.  She states she has felt lightheaded for 3 days, had 5 days of chest pain that is increasing without any obvious exacerbating or alleviating factors with the exception that the chest pain is increased with anxiety.  She notes she has been off of her psychiatric and schizophrenia meds for 2 weeks and states \"I am just not functional.\"  She is also complaining of some mild cough and congestion which began today.  She is hoping she can be restarted on her psychiatric medications.      Independent Historian:   None - Patient Only    [facility]    ROS:  Review of Systems    Allergies:  Sulfa Drugs     Medications:    FLUoxetine (PROZAC) 20 MG capsule  OLANZapine (ZYPREXA) 10 MG tablet  ramelteon (ROZEREM) 8 MG tablet        Past Medical History:    No past medical history on file.    Past Surgical History:    No past surgical history on file.     Family History:    family history is not on file.    Social History:     PCP: No Ref-Primary, Physician     Physical Exam     Patient Vitals for the past 24 hrs:   BP Pulse Resp SpO2 Height Weight   04/15/23 1000 101/60 84 13 99 % -- --   04/15/23 0952 -- -- -- -- 1.626 m (5' 4\") 70.3 kg (155 lb)        Physical Exam  General/Appearance: appears stated age,  Mildly discheveled, appears comfortable  Eyes: EOMI, no scleral injection, no icterus  ENT: MMM  Neck: supple, nl ROM, no stiffness  Cardiovascular: RRR, nl S1S2, no m/r/g, 2+ pulses in all 4 extremities, cap refill <2sec  Respiratory: CTAB, good air movement throughout, no wheezes/rhonchi/rales, no increased WOB, no retractions  GI: abd soft, non-distended, nttp,  no HSM, no rebound, no guarding, nl BS  MSK: ONEAL, good tone, no bony abnormality  Skin: warm and well-perfused, no rash, no edema, no ecchymosis, nl " turgor  Neuro: GCS 15, alert and oriented, no gross focal neuro deficits  Psych:flight of ideas, - hallucinations, - SI/HI, good insight  Heme: no petechia, no purpura, no active bleeding        Emergency Department Course       Imaging:  XR Chest Port 1 View   Final Result   IMPRESSION: Cardiomediastinal silhouette is normal. Normal vasculature. Lungs and pleural spaces are clear.         Report per radiology    Laboratory:  Labs Ordered and Resulted from Time of ED Arrival to Time of ED Departure   BASIC METABOLIC PANEL - Abnormal       Result Value    Sodium 140      Potassium 2.9 (*)     Chloride 101      Carbon Dioxide (CO2) 27      Anion Gap 12      Urea Nitrogen 4.9 (*)     Creatinine 0.57      Calcium 9.2      Glucose 101 (*)     GFR Estimate >90     CBC WITH PLATELETS AND DIFFERENTIAL - Abnormal    WBC Count 4.4      RBC Count 5.24 (*)     Hemoglobin 8.2 (*)     Hematocrit 31.4 (*)     MCV 60 (*)     MCH 15.6 (*)     MCHC 26.1 (*)     RDW 20.5 (*)     Platelet Count 449      % Neutrophils 79      % Lymphocytes 17      % Monocytes 3      % Eosinophils 0      % Basophils 1      % Immature Granulocytes 0      NRBCs per 100 WBC 0      Absolute Neutrophils 3.4      Absolute Lymphocytes 0.8      Absolute Monocytes 0.2      Absolute Eosinophils 0.0      Absolute Basophils 0.0      Absolute Immature Granulocytes 0.0      Absolute NRBCs 0.0     RBC AND PLATELET MORPHOLOGY - Abnormal    Platelet Assessment        Value: Automated Count Confirmed. Platelet morphology is normal.    Elliptocytes Moderate (*)     RBC Fragments Rare (*)     Teardrop Cells Slight (*)     RBC Morphology Confirmed RBC Indices     TROPONIN T, HIGH SENSITIVITY - Normal    Troponin T, High Sensitivity <6     COVID-19 VIRUS (CORONAVIRUS) BY PCR - Normal    SARS CoV2 PCR Negative       Emergency Department Course & Assessments:    Interventions:  Medications   LORazepam (ATIVAN) tablet 1 mg (1 mg Oral $Given 4/15/23 1057)   potassium chloride ER  "(KLOR-CON M) CR tablet 40 mEq (40 mEq Oral $Given 4/15/23 1350)        Assessments:  1200 Updated on results. Unwilling to go to [facility] so placing [facility] eval request    Independent Interpretation (X-rays, CTs, rhythm strip):  None    Consultations/Discussion of Management or Tests:  1352 [facility] - they feel she is Benzo seeking and will not be providing Rx for pt today        Social Determinants of Health affecting care:   Healthcare Access/Compliance, Stress/Adjustment Disorders and Social Connections/Isolation    Disposition:  The patient was discharged to home.     Impression & Plan        Medical Decision Making:  This patient is a 47-year-old female with history of schizophrenia who primarily is presenting for medication refills.  She specifically is asking for benzodiazepines as she states she has been out of her medication for the past 2 weeks.  I had [facility] check in with the patient.  They did have thorough chart review and feel that she is benzo seeking.  They state and none of the records is or evidence that she is actually been out of any of her other psychiatric medications and that she is \"well plugged into psychiatric care.\"  She also complained today of some lightheadedness and some chest pain.  Labs are unremarkable.  She endorses a mild cough and congestion but she is afebrile.  At this point in time I think her symptoms are likely secondary to viral syndrome and that she is safe for discharge.  Diagnosis:    ICD-10-CM    1. Schizophrenia, unspecified type (H)  F20.9       2. Chest pain, unspecified type  R07.9       3. Cough, unspecified type  R05.9       4. Lightheadedness  R42            Discharge Medications:  New Prescriptions    No medications on file           Tatum Paz MD  04/15/23 1355       Tatum Paz MD  04/15/23 1359    "